# Patient Record
Sex: MALE | URBAN - METROPOLITAN AREA
[De-identification: names, ages, dates, MRNs, and addresses within clinical notes are randomized per-mention and may not be internally consistent; named-entity substitution may affect disease eponyms.]

---

## 2023-02-15 ENCOUNTER — OFFICE (OUTPATIENT)
Dept: URBAN - METROPOLITAN AREA CLINIC 34 | Facility: CLINIC | Age: 83
Setting detail: OPHTHALMOLOGY
End: 2023-02-15
Payer: COMMERCIAL

## 2023-02-15 DIAGNOSIS — H26.493: ICD-10-CM

## 2023-02-15 DIAGNOSIS — H40.023: ICD-10-CM

## 2023-02-15 PROCEDURE — 92004 COMPRE OPH EXAM NEW PT 1/>: CPT | Performed by: OPHTHALMOLOGY

## 2023-02-15 ASSESSMENT — SPHEQUIV_DERIVED
OS_SPHEQUIV: -3.625
OS_SPHEQUIV: -3.625
OD_SPHEQUIV: -0.75
OD_SPHEQUIV: -0.75

## 2023-02-15 ASSESSMENT — REFRACTION_CURRENTRX
OD_SPHERE: +1.25
OD_VPRISM_DIRECTION: SV
OS_VPRISM_DIRECTION: SV
OS_OVR_VA: 20/
OS_SPHERE: +1.00
OD_AXIS: 175
OD_CYLINDER: +1.00
OS_AXIS: 008
OD_OVR_VA: 20/
OS_CYLINDER: +1.50

## 2023-02-15 ASSESSMENT — AXIALLENGTH_DERIVED
OS_AL: 25.9538
OD_AL: 24.6154
OS_AL: 25.9538
OD_AL: 24.6154

## 2023-02-15 ASSESSMENT — KERATOMETRY
OD_K1POWER_DIOPTERS: 41.25
OS_AXISANGLE_DEGREES: 154
OS_K2POWER_DIOPTERS: 41.75
OD_K2POWER_DIOPTERS: 42.00
OS_K1POWER_DIOPTERS: 41.25
OD_AXISANGLE_DEGREES: 088

## 2023-02-15 ASSESSMENT — REFRACTION_AUTOREFRACTION
OS_CYLINDER: +2.25
OD_CYLINDER: +0.50
OD_AXIS: 112
OS_AXIS: 178
OD_SPHERE: -1.00
OS_SPHERE: -4.75

## 2023-02-15 ASSESSMENT — CONFRONTATIONAL VISUAL FIELD TEST (CVF)
OS_FINDINGS: FULL
OD_FINDINGS: FULL

## 2023-02-15 ASSESSMENT — VISUAL ACUITY
OS_BCVA: 20/25
OD_BCVA: 20/200

## 2023-02-15 ASSESSMENT — DRY EYES - PHYSICIAN NOTES: OD_GENERALCOMMENTS: FE LINE

## 2023-02-15 ASSESSMENT — TONOMETRY
OD_IOP_MMHG: 20
OS_IOP_MMHG: 16

## 2023-02-15 ASSESSMENT — REFRACTION_MANIFEST
OS_SPHERE: -4.75
OD_CYLINDER: +0.50
OS_CYLINDER: +2.25
OS_VA1: 20/NI
OD_AXIS: 115
OD_SPHERE: -1.00
OS_AXIS: 180

## 2023-03-17 ENCOUNTER — OFFICE (OUTPATIENT)
Dept: URBAN - METROPOLITAN AREA CLINIC 35 | Facility: CLINIC | Age: 83
Setting detail: OPHTHALMOLOGY
End: 2023-03-17
Payer: COMMERCIAL

## 2023-03-17 DIAGNOSIS — H26.492: ICD-10-CM

## 2023-03-17 PROCEDURE — 66821 AFTER CATARACT LASER SURGERY: CPT | Performed by: OPHTHALMOLOGY

## 2023-03-17 ASSESSMENT — REFRACTION_CURRENTRX
OD_CYLINDER: +1.00
OS_SPHERE: +1.00
OD_SPHERE: +1.25
OD_VPRISM_DIRECTION: SV
OD_OVR_VA: 20/
OS_AXIS: 008
OS_OVR_VA: 20/
OS_VPRISM_DIRECTION: SV
OS_CYLINDER: +1.50
OD_AXIS: 175

## 2023-03-17 ASSESSMENT — KERATOMETRY
OS_K1POWER_DIOPTERS: 41.25
OD_K2POWER_DIOPTERS: 41.25
OS_AXISANGLE_DEGREES: 117
OD_AXISANGLE_DEGREES: 170
OD_K1POWER_DIOPTERS: 40.75
OS_K2POWER_DIOPTERS: 42.25

## 2023-03-17 ASSESSMENT — AXIALLENGTH_DERIVED
OD_AL: 24.9765
OS_AL: 25.8431
OS_AL: 35.6888
OD_AL: 24.8678

## 2023-03-17 ASSESSMENT — REFRACTION_AUTOREFRACTION
OD_CYLINDER: +1.50
OS_SPHERE: -19.00
OS_AXIS: 063
OD_AXIS: 009
OS_CYLINDER: +0.25
OD_SPHERE: -1.75

## 2023-03-17 ASSESSMENT — VISUAL ACUITY
OD_BCVA: 20/200
OS_BCVA: 20/25

## 2023-03-17 ASSESSMENT — REFRACTION_MANIFEST
OS_SPHERE: -4.75
OD_SPHERE: -1.00
OD_CYLINDER: +0.50
OS_AXIS: 180
OS_CYLINDER: +2.25
OS_VA1: 20/NI
OD_AXIS: 115

## 2023-03-17 ASSESSMENT — SPHEQUIV_DERIVED
OS_SPHEQUIV: -18.875
OS_SPHEQUIV: -3.625
OD_SPHEQUIV: -0.75
OD_SPHEQUIV: -1

## 2023-03-17 ASSESSMENT — CONFRONTATIONAL VISUAL FIELD TEST (CVF)
OD_FINDINGS: FULL
OS_FINDINGS: FULL

## 2023-04-17 ENCOUNTER — OFFICE (OUTPATIENT)
Dept: URBAN - METROPOLITAN AREA CLINIC 34 | Facility: CLINIC | Age: 83
Setting detail: OPHTHALMOLOGY
End: 2023-04-17
Payer: COMMERCIAL

## 2023-04-17 DIAGNOSIS — H26.491: ICD-10-CM

## 2023-04-17 PROCEDURE — 99213 OFFICE O/P EST LOW 20 MIN: CPT | Performed by: OPHTHALMOLOGY

## 2023-04-17 ASSESSMENT — VISUAL ACUITY
OD_BCVA: 20/30-2+1
OS_BCVA: 20/25

## 2023-04-17 ASSESSMENT — KERATOMETRY
OD_AXISANGLE_DEGREES: 090
OD_K2POWER_DIOPTERS: 41.25
OS_AXISANGLE_DEGREES: 157
OS_K1POWER_DIOPTERS: 41.00
OD_K1POWER_DIOPTERS: 41.25
OS_K2POWER_DIOPTERS: 41.75

## 2023-04-17 ASSESSMENT — REFRACTION_MANIFEST
OD_AXIS: 115
OS_CYLINDER: +2.25
OS_VA1: 20/20
OD_VA1: 20/25
OD_CYLINDER: +0.50
OS_AXIS: 180
OD_SPHERE: -1.00
OD_AXIS: 005
OS_SPHERE: -4.75
OD_SPHERE: -1.00
OD_CYLINDER: +1.00
OS_AXIS: 160
OS_CYLINDER: +1.50
OS_VA1: 20/NI
OS_SPHERE: -1.50

## 2023-04-17 ASSESSMENT — AXIALLENGTH_DERIVED
OD_AL: 24.6594
OS_AL: 26.0095
OS_AL: 24.7157
OD_AL: 24.7662
OD_AL: 24.6594
OS_AL: 24.7157

## 2023-04-17 ASSESSMENT — REFRACTION_CURRENTRX
OD_OVR_VA: 20/
OS_AXIS: 008
OS_VPRISM_DIRECTION: SV
OS_CYLINDER: +1.50
OS_OVR_VA: 20/
OD_AXIS: 175
OS_OVR_VA: 20/
OD_SPHERE: +1.25
OD_OVR_VA: 20/
OD_CYLINDER: +1.00
OS_SPHERE: +1.00
OD_VPRISM_DIRECTION: SV

## 2023-04-17 ASSESSMENT — REFRACTION_AUTOREFRACTION
OD_CYLINDER: +1.00
OD_SPHERE: -1.00
OS_CYLINDER: +1.50
OS_SPHERE: -1.50
OS_AXIS: 161
OD_AXIS: 003

## 2023-04-17 ASSESSMENT — DRY EYES - PHYSICIAN NOTES: OD_GENERALCOMMENTS: FE LINE

## 2023-04-17 ASSESSMENT — SPHEQUIV_DERIVED
OS_SPHEQUIV: -0.75
OD_SPHEQUIV: -0.5
OS_SPHEQUIV: -0.75
OS_SPHEQUIV: -3.625
OD_SPHEQUIV: -0.5
OD_SPHEQUIV: -0.75

## 2023-04-17 ASSESSMENT — CONFRONTATIONAL VISUAL FIELD TEST (CVF)
OS_FINDINGS: FULL
OD_FINDINGS: FULL

## 2023-06-16 ENCOUNTER — OFFICE (OUTPATIENT)
Dept: URBAN - METROPOLITAN AREA CLINIC 35 | Facility: CLINIC | Age: 83
Setting detail: OPHTHALMOLOGY
End: 2023-06-16
Payer: COMMERCIAL

## 2023-06-16 DIAGNOSIS — H26.491: ICD-10-CM

## 2023-06-16 PROCEDURE — 66821 AFTER CATARACT LASER SURGERY: CPT | Performed by: OPHTHALMOLOGY

## 2023-06-16 ASSESSMENT — CONFRONTATIONAL VISUAL FIELD TEST (CVF)
OS_FINDINGS: FULL
OD_FINDINGS: FULL

## 2023-06-16 ASSESSMENT — REFRACTION_MANIFEST
OD_AXIS: 115
OS_SPHERE: -1.50
OS_VA1: 20/20
OS_VA1: 20/NI
OD_SPHERE: -1.00
OS_SPHERE: -4.75
OD_VA1: 20/25
OS_CYLINDER: +1.50
OS_CYLINDER: +2.25
OD_SPHERE: -1.00
OD_CYLINDER: +0.50
OD_CYLINDER: +1.00
OD_AXIS: 005
OS_AXIS: 180
OS_AXIS: 160

## 2023-06-16 ASSESSMENT — REFRACTION_CURRENTRX
OD_SPHERE: +1.25
OD_VPRISM_DIRECTION: SV
OS_VPRISM_DIRECTION: SV
OS_OVR_VA: 20/
OS_CYLINDER: +1.50
OS_OVR_VA: 20/
OD_CYLINDER: +1.00
OS_AXIS: 008
OD_AXIS: 175
OD_OVR_VA: 20/
OS_SPHERE: +1.00
OD_OVR_VA: 20/

## 2023-06-16 ASSESSMENT — SPHEQUIV_DERIVED
OD_SPHEQUIV: -0.75
OS_SPHEQUIV: -0.875
OD_SPHEQUIV: -0.625
OD_SPHEQUIV: -0.5
OS_SPHEQUIV: -0.75
OS_SPHEQUIV: -3.625

## 2023-06-16 ASSESSMENT — KERATOMETRY
OD_AXISANGLE_DEGREES: 009
OS_AXISANGLE_DEGREES: 090
OS_K1POWER_DIOPTERS: 41.50
OD_K1POWER_DIOPTERS: 41.00
OS_K2POWER_DIOPTERS: 41.50
OD_K2POWER_DIOPTERS: 41.25

## 2023-06-16 ASSESSMENT — AXIALLENGTH_DERIVED
OD_AL: 24.8169
OS_AL: 25.9538
OD_AL: 24.7096
OS_AL: 24.7188
OS_AL: 24.6654
OD_AL: 24.7632

## 2023-06-16 ASSESSMENT — VISUAL ACUITY
OD_BCVA: 20/40-2
OS_BCVA: 20/30

## 2023-06-16 ASSESSMENT — REFRACTION_AUTOREFRACTION
OS_CYLINDER: +0.75
OD_CYLINDER: +0.75
OD_SPHERE: -1.00
OS_SPHERE: -1.25
OS_AXIS: 162
OD_AXIS: 023

## 2024-01-04 ENCOUNTER — OFFICE (OUTPATIENT)
Dept: URBAN - METROPOLITAN AREA CLINIC 34 | Facility: CLINIC | Age: 84
Setting detail: OPHTHALMOLOGY
End: 2024-01-04
Payer: COMMERCIAL

## 2024-01-04 DIAGNOSIS — H40.023: ICD-10-CM

## 2024-01-04 DIAGNOSIS — H11.153: ICD-10-CM

## 2024-01-04 DIAGNOSIS — H26.493: ICD-10-CM

## 2024-01-04 PROCEDURE — 99213 OFFICE O/P EST LOW 20 MIN: CPT | Performed by: OPHTHALMOLOGY

## 2024-01-04 ASSESSMENT — REFRACTION_MANIFEST
OS_CYLINDER: +1.50
OD_AXIS: 005
OD_SPHERE: -1.00
OS_SPHERE: -1.50
OS_VA1: 20/NI
OS_AXIS: 160
OD_CYLINDER: +0.50
OD_CYLINDER: +1.00
OS_CYLINDER: +2.25
OD_VA1: 20/25
OS_SPHERE: -4.75
OS_AXIS: 180
OD_SPHERE: -1.00
OD_AXIS: 115
OS_VA1: 20/20

## 2024-01-04 ASSESSMENT — SPHEQUIV_DERIVED
OS_SPHEQUIV: -0.75
OD_SPHEQUIV: -0.75
OS_SPHEQUIV: -0.625
OD_SPHEQUIV: -0.375
OS_SPHEQUIV: -3.625
OD_SPHEQUIV: -0.5

## 2024-01-04 ASSESSMENT — REFRACTION_CURRENTRX
OD_AXIS: 175
OD_VPRISM_DIRECTION: SV
OD_OVR_VA: 20/
OS_VPRISM_DIRECTION: SV
OD_OVR_VA: 20/
OS_CYLINDER: +1.50
OD_SPHERE: +1.25
OS_AXIS: 008
OS_SPHERE: +1.00
OS_OVR_VA: 20/
OS_OVR_VA: 20/
OD_CYLINDER: +1.00

## 2024-01-04 ASSESSMENT — CONFRONTATIONAL VISUAL FIELD TEST (CVF)
OS_FINDINGS: FULL
OD_FINDINGS: FULL

## 2024-01-04 ASSESSMENT — REFRACTION_AUTOREFRACTION
OD_CYLINDER: +0.75
OD_SPHERE: -0.75
OS_CYLINDER: +1.25
OD_AXIS: 022
OS_SPHERE: -1.25
OS_AXIS: 168

## 2024-06-25 ENCOUNTER — EMERGENCY (EMERGENCY)
Facility: HOSPITAL | Age: 84
LOS: 0 days | Discharge: HOME | End: 2024-06-25
Payer: MEDICARE

## 2024-06-25 VITALS
HEART RATE: 60 BPM | SYSTOLIC BLOOD PRESSURE: 145 MMHG | RESPIRATION RATE: 18 BRPM | OXYGEN SATURATION: 100 % | DIASTOLIC BLOOD PRESSURE: 78 MMHG | TEMPERATURE: 98 F

## 2024-06-25 VITALS
RESPIRATION RATE: 19 BRPM | TEMPERATURE: 98 F | SYSTOLIC BLOOD PRESSURE: 147 MMHG | OXYGEN SATURATION: 99 % | DIASTOLIC BLOOD PRESSURE: 75 MMHG | WEIGHT: 175.05 LBS | HEART RATE: 65 BPM

## 2024-06-25 DIAGNOSIS — H91.93 UNSPECIFIED HEARING LOSS, BILATERAL: ICD-10-CM

## 2024-06-25 DIAGNOSIS — I10 ESSENTIAL (PRIMARY) HYPERTENSION: ICD-10-CM

## 2024-06-25 PROCEDURE — 99283 EMERGENCY DEPT VISIT LOW MDM: CPT

## 2024-06-25 NOTE — ED ADULT TRIAGE NOTE - CHIEF COMPLAINT QUOTE
Patient presents to ED c/o difficulty hearing in b/l ears for several months. Denies any drainage or ringing. No loss of balance.   hx HTN

## 2024-06-25 NOTE — ED PROVIDER NOTE - PHYSICAL EXAMINATION
GEN: Awake, alert, interactive, NAD.  HEAD AND NECK: NC/AT. Airway patent. Neck supple.   EYES:  Clear b/l. EOMI. PERRL.   ENT: Moist mucus membranes. EARS: TM normal bilaterally, (-) mastoid tenderness, (-) erythema, (-) exudates   CARDIAC: Regular rate, regular rhythm. No evident pedal edema.    RESP/CHEST: Normal respiratory effort with no use of accessory muscles or retractions. Clear throughout on auscultation.  ABD: soft, non-distended, non-tender. No rebound, no guarding.   BACK: No midline spinal TTP. No CVAT.   EXTREMITIES: Moving all extremities with no apparent deformities.   SKIN: Warm, dry, intact normal color. No rash.   NEURO: AOx3, CN II-XII grossly intact, no focal deficits.   PSYCH: Appropriate mood and affect.

## 2024-06-25 NOTE — ED PROVIDER NOTE - CHPI ED SYMPTOMS NEG
no blurred vision/no loss of consciousness/no nausea/no numbness/no syncope/no vomiting/no change in level of consciousness/no chills

## 2024-06-25 NOTE — ED PROVIDER NOTE - CLINICAL SUMMARY MEDICAL DECISION MAKING FREE TEXT BOX
82 y/o male with htn here with bilateral hearing loss for years. No signs of infection or cerumen impaction.   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   No neurological deficits otherwise well appearing.   Pt stable ot be discharged home and follow up with ENT>

## 2024-06-25 NOTE — ED PROVIDER NOTE - PATIENT PORTAL LINK FT
You can access the FollowMyHealth Patient Portal offered by [facility] by registering at the following website: [email or web address]. By joining [employer]’s FollowMyHealth portal, you will also be able to view your health information using other applications (apps) compatible with our system.

## 2024-06-25 NOTE — ED ADULT NURSE NOTE - OBJECTIVE STATEMENT
83 year old male a/ox3 c/o bilateral ear loss, pt reports he went to an ENT x years, pt reports they put something in his ear but unable to recall, pt reports some relief but has not seen a ENT in a while, pt denies pain, drainage, fever/chills, n/v hx: htn

## 2024-06-25 NOTE — ED PROVIDER NOTE - OBJECTIVE STATEMENT
82 y/o male with htn not on meds here with bilateral hearing loss for years. Pt states he needs his ears cleaned. Pt denies any pain. Pt reports having hearing devices in the past and it used it for 3 years. Pt states its stopped working and didn't want to get a new one. Pt denies headache, dizziness, fever, chills, nausea, vomiting. Denies ear drainage or trauma.

## 2024-06-25 NOTE — ED PROVIDER NOTE - NSFOLLOWUPCLINICS_GEN_ALL_ED_FT
[facility]  ENT  [address], [address]  [place] [address]  Phone: [phone]  Fax:   Follow Up Time: 1-3 Days

## 2024-06-25 NOTE — ED PROVIDER NOTE - NSFOLLOWUPINSTRUCTIONS_ED_ALL_ED_FT
Rest, drink plenty of fluids.  Advance activity as tolerated.  Continue all previously prescribed medications as directed.  Follow up with your ENT  in 48-72 hours- bring copies of your results.  Return to the ER for worsening or persistent symptoms, and/or ANY NEW OR CONCERNING SYMPTOMS. If you have issues obtaining follow up, please call: [phone] to obtain a doctor or specialist who takes your insurance in your area.  You may call [phone] to make an appointment with the internal medicine clinic.

## 2024-06-25 NOTE — ED ADULT NURSE NOTE - CCCP TRG CHIEF CMPLNT
What Type Of Note Output Would You Prefer (Optional)?: Bullet Format
How Severe Are Your Spot(S)?: mild
Have Your Spot(S) Been Treated In The Past?: has not been treated
Hpi Title: Evaluation of Skin Lesions
ear hearing change

## 2024-06-25 NOTE — ED PROVIDER NOTE - CARE PROVIDER_API CALL
Ector Benítez  Otolaryngology  [address], [place] [address]  Phone: [phone]  Fax: [phone]  Follow Up Time: 1-3 Days

## 2025-03-07 ENCOUNTER — OFFICE (OUTPATIENT)
Facility: LOCATION | Age: 85
Setting detail: OPHTHALMOLOGY
End: 2025-03-07
Payer: MEDICARE

## 2025-03-07 DIAGNOSIS — H11.153: ICD-10-CM

## 2025-03-07 DIAGNOSIS — E11.9: ICD-10-CM

## 2025-03-07 DIAGNOSIS — H40.023: ICD-10-CM

## 2025-03-07 DIAGNOSIS — H26.493: ICD-10-CM

## 2025-03-07 PROBLEM — H43.391 VITREOUS FLOATERS; RIGHT EYE: Status: ACTIVE | Noted: 2025-03-07

## 2025-03-07 PROCEDURE — 92133 CPTRZD OPH DX IMG PST SGM ON: CPT | Performed by: OPHTHALMOLOGY

## 2025-03-07 PROCEDURE — 92014 COMPRE OPH EXAM EST PT 1/>: CPT | Performed by: OPHTHALMOLOGY

## 2025-03-07 ASSESSMENT — KERATOMETRY
OD_K1POWER_DIOPTERS: 40.50
OS_AXISANGLE_DEGREES: 164
OS_K1POWER_DIOPTERS: 41.00
OD_K2POWER_DIOPTERS: 41.00
OD_AXISANGLE_DEGREES: 041
METHOD_AUTO_MANUAL: AUTO
OS_K2POWER_DIOPTERS: 41.25

## 2025-03-07 ASSESSMENT — REFRACTION_CURRENTRX
OD_OVR_VA: 20/
OD_VPRISM_DIRECTION: SV
OS_OVR_VA: 20/
OD_AXIS: 175
OD_OVR_VA: 20/
OS_SPHERE: +1.00
OS_AXIS: 008
OS_VPRISM_DIRECTION: SV
OS_CYLINDER: +1.50
OD_SPHERE: +1.25
OS_OVR_VA: 20/
OD_CYLINDER: +1.00

## 2025-03-07 ASSESSMENT — VISUAL ACUITY
OS_BCVA: 20/30+2
OD_BCVA: 20/25

## 2025-03-07 ASSESSMENT — REFRACTION_AUTOREFRACTION
OS_AXIS: 081
OS_SPHERE: +0.25
OS_CYLINDER: -1.50
OD_AXIS: 118
OD_SPHERE: 0.00
OD_CYLINDER: -0.25

## 2025-03-07 ASSESSMENT — REFRACTION_MANIFEST
OS_VA1: 20/NI
OD_AXIS: 005
OD_SPHERE: -1.00
OS_CYLINDER: +2.25
OD_SPHERE: -1.00
OS_AXIS: 180
OS_SPHERE: -1.50
OS_AXIS: 160
OD_AXIS: 115
OD_CYLINDER: +0.50
OD_CYLINDER: +1.00
OS_CYLINDER: +1.50
OD_VA1: 20/25
OS_SPHERE: -4.75
OS_VA1: 20/20

## 2025-03-07 ASSESSMENT — CONFRONTATIONAL VISUAL FIELD TEST (CVF)
OD_FINDINGS: FULL
OS_FINDINGS: FULL